# Patient Record
Sex: MALE | Race: BLACK OR AFRICAN AMERICAN | NOT HISPANIC OR LATINO | URBAN - METROPOLITAN AREA
[De-identification: names, ages, dates, MRNs, and addresses within clinical notes are randomized per-mention and may not be internally consistent; named-entity substitution may affect disease eponyms.]

---

## 2023-06-08 ENCOUNTER — EMERGENCY (EMERGENCY)
Facility: HOSPITAL | Age: 68
LOS: 1 days | Discharge: HOME | End: 2023-06-08
Attending: EMERGENCY MEDICINE
Payer: MEDICARE

## 2023-06-08 ENCOUNTER — OUTPATIENT (OUTPATIENT)
Dept: OUTPATIENT SERVICES | Facility: HOSPITAL | Age: 68
LOS: 1 days | End: 2023-06-08
Payer: MEDICARE

## 2023-06-08 VITALS
TEMPERATURE: 98 F | DIASTOLIC BLOOD PRESSURE: 70 MMHG | OXYGEN SATURATION: 98 % | RESPIRATION RATE: 16 BRPM | HEART RATE: 79 BPM | HEIGHT: 66 IN | SYSTOLIC BLOOD PRESSURE: 103 MMHG | WEIGHT: 166.89 LBS

## 2023-06-08 VITALS
WEIGHT: 164.91 LBS | SYSTOLIC BLOOD PRESSURE: 104 MMHG | OXYGEN SATURATION: 98 % | HEART RATE: 71 BPM | DIASTOLIC BLOOD PRESSURE: 71 MMHG | RESPIRATION RATE: 16 BRPM | TEMPERATURE: 98 F

## 2023-06-08 DIAGNOSIS — Z01.818 ENCOUNTER FOR OTHER PREPROCEDURAL EXAMINATION: ICD-10-CM

## 2023-06-08 DIAGNOSIS — K05.6 PERIODONTAL DISEASE, UNSPECIFIED: ICD-10-CM

## 2023-06-08 DIAGNOSIS — K02.62 DENTAL CARIES ON SMOOTH SURFACE PENETRATING INTO DENTIN: ICD-10-CM

## 2023-06-08 DIAGNOSIS — K02.9 DENTAL CARIES, UNSPECIFIED: ICD-10-CM

## 2023-06-08 LAB
ALBUMIN SERPL ELPH-MCNC: 4.2 G/DL — SIGNIFICANT CHANGE UP (ref 3.3–5)
ALP SERPL-CCNC: 130 U/L — HIGH (ref 40–120)
ALT FLD-CCNC: 19 U/L — SIGNIFICANT CHANGE UP (ref 10–45)
ANION GAP SERPL CALC-SCNC: 11 MMOL/L — SIGNIFICANT CHANGE UP (ref 5–17)
AST SERPL-CCNC: 22 U/L — SIGNIFICANT CHANGE UP (ref 10–40)
BILIRUB SERPL-MCNC: 0.4 MG/DL — SIGNIFICANT CHANGE UP (ref 0.2–1.2)
BUN SERPL-MCNC: 17 MG/DL — SIGNIFICANT CHANGE UP (ref 7–23)
CALCIUM SERPL-MCNC: 10 MG/DL — SIGNIFICANT CHANGE UP (ref 8.4–10.5)
CHLORIDE SERPL-SCNC: 105 MMOL/L — SIGNIFICANT CHANGE UP (ref 96–108)
CO2 SERPL-SCNC: 24 MMOL/L — SIGNIFICANT CHANGE UP (ref 22–31)
CREAT SERPL-MCNC: 0.87 MG/DL — SIGNIFICANT CHANGE UP (ref 0.5–1.3)
EGFR: 95 ML/MIN/1.73M2 — SIGNIFICANT CHANGE UP
GLUCOSE SERPL-MCNC: 74 MG/DL — SIGNIFICANT CHANGE UP (ref 70–99)
HCT VFR BLD CALC: 44.1 % — SIGNIFICANT CHANGE UP (ref 39–50)
HGB BLD-MCNC: 15.2 G/DL — SIGNIFICANT CHANGE UP (ref 13–17)
MCHC RBC-ENTMCNC: 29.6 PG — SIGNIFICANT CHANGE UP (ref 27–34)
MCHC RBC-ENTMCNC: 34.5 GM/DL — SIGNIFICANT CHANGE UP (ref 32–36)
MCV RBC AUTO: 85.8 FL — SIGNIFICANT CHANGE UP (ref 80–100)
NRBC # BLD: 0 /100 WBCS — SIGNIFICANT CHANGE UP (ref 0–0)
PLATELET # BLD AUTO: 177 K/UL — SIGNIFICANT CHANGE UP (ref 150–400)
POTASSIUM SERPL-MCNC: 4.4 MMOL/L — SIGNIFICANT CHANGE UP (ref 3.5–5.3)
POTASSIUM SERPL-SCNC: 4.4 MMOL/L — SIGNIFICANT CHANGE UP (ref 3.5–5.3)
PROT SERPL-MCNC: 8 G/DL — SIGNIFICANT CHANGE UP (ref 6–8.3)
RBC # BLD: 5.14 M/UL — SIGNIFICANT CHANGE UP (ref 4.2–5.8)
RBC # FLD: 13.6 % — SIGNIFICANT CHANGE UP (ref 10.3–14.5)
SODIUM SERPL-SCNC: 140 MMOL/L — SIGNIFICANT CHANGE UP (ref 135–145)
WBC # BLD: 6.44 K/UL — SIGNIFICANT CHANGE UP (ref 3.8–10.5)
WBC # FLD AUTO: 6.44 K/UL — SIGNIFICANT CHANGE UP (ref 3.8–10.5)

## 2023-06-08 PROCEDURE — 71046 X-RAY EXAM CHEST 2 VIEWS: CPT

## 2023-06-08 PROCEDURE — 85027 COMPLETE CBC AUTOMATED: CPT

## 2023-06-08 PROCEDURE — 99283 EMERGENCY DEPT VISIT LOW MDM: CPT

## 2023-06-08 PROCEDURE — 99282 EMERGENCY DEPT VISIT SF MDM: CPT

## 2023-06-08 PROCEDURE — 71046 X-RAY EXAM CHEST 2 VIEWS: CPT | Mod: 26

## 2023-06-08 PROCEDURE — G0463: CPT

## 2023-06-08 PROCEDURE — 80053 COMPREHEN METABOLIC PANEL: CPT

## 2023-06-08 NOTE — H&P PST ADULT - ASSESSMENT
Mallampati 2  DASI score: 5.38  DASI activity: ADLs, walks up 1 flight of stairs, able to walk 2 blocks, moderate recreational activity at [facility]  Loose teeth or denture: denies loose teeth/dentures    Dental Caries

## 2023-06-08 NOTE — ED PROVIDER NOTE - PHYSICAL EXAMINATION
CONSTITUTIONAL: NAD  SKIN: Warm dry, normal skin turgor  HEAD: NCAT  ENT: Pt missing several upper and lower molars/premolars but dentition otherwise in tact w/ no erythema, no swelling.   NECK: Supple; non tender. Full ROM.  CARD: RRR  RESP: No respiratory distress  ABD: non-distended  MSK: Full ROM, no leg swelling  PSYCH: Cooperative, appropriate.

## 2023-06-08 NOTE — H&P PST ADULT - NSANTHOSAYNRD_GEN_A_CORE
No. JIHAN screening performed.  STOP BANG Legend: 0-2 = LOW Risk; 3-4 = INTERMEDIATE Risk; 5-8 = HIGH Risk

## 2023-06-08 NOTE — ED PROVIDER NOTE - CLINICAL SUMMARY MEDICAL DECISION MAKING FREE TEXT BOX
Pt arrives for unknown presurgical testing, will try and get collateral, pt has no complaints at this time. Pt arrives for unknown presurgical testing, will try and get collateral, pt has no complaints at this time.  [place]: went to see pt but he and his attendant had left and walked to dental clinic.

## 2023-06-08 NOTE — H&P PST ADULT - HAVE YOU HAD COVID IN THE LAST 60 DAYS?
Fever    A fever is an increase in the body's temperature above 100.4°F (38°C) or higher. In adults and children older than three months, a brief mild or moderate fever generally has no long-term effect, and it usually does not require treatment. Many times, fevers are the result of viral infections, which are self-resolving.  However, certain symptoms or diagnostic tests may suggest a bacterial infection that may respond to antibiotics. Take medications as directed by your health care provider.    SEEK IMMEDIATE MEDICAL CARE IF YOU OR YOUR CHILD HAVE ANY OF THE FOLLOWING SYMPTOMS : shortness of breath, seizure, rash/stiff neck/headache, severe abdominal pain, persistent vomiting, any signs of dehydration, or if your child has a fever for over five (5) days.    Diarrhea    Diarrhea is frequent loose or watery bowel movements that has many causes. Diarrhea can make you feel weak and cause you to become dehydrated. Diarrhea typically lasts 2–3 days, but can last longer if it is a sign of something more serious. Drink clear fluids to prevent dehydration. Eat bland, easy-to-digest foods as tolerated.     SEEK IMMEDIATE MEDICAL CARE IF YOU HAVE ANY OF THE FOLLOWING SYMPTOMS: high fevers, lightheadedness/dizziness, chest pain, black or bloody stools, shortness of breath, severe abdominal or back pain, or any signs of dehydration. No

## 2023-06-08 NOTE — H&P PST ADULT - NSICDXPASTMEDICALHX_GEN_ALL_CORE_FT
PAST MEDICAL HISTORY:  Anxiety     Dental caries     Developmental delay     Moderate intellectual disability     Periodontal disease     Positive PPD, treated     Thoracic scoliosis

## 2023-06-08 NOTE — H&P PST ADULT - GASTROINTESTINAL COMMENTS
h/o choking on food because he eats too fast - thin liquids ok, food cut into 1/2 inch pieces - eats with supervision

## 2023-06-08 NOTE — H&P PST ADULT - PROBLEM SELECTOR PLAN 1
Comprehensive Dental Treatment Under Anesthesia  [facility] Staff will schedule preop evaluation with PCP Dr Arnol Cabello Mindful questionnaire given to [facility] staff with instructions to bring completed form back to hospital on day of surgery  Pt sent to Radiology for CXR 6/8/2023 given h/o choking on food due to impulsivity/eating too fast  [facility] staff informed that anesthesia will contact designated [facility] legal guardian Sandra Laguerre at [phone] prior to day of surgery to obtain consent.

## 2023-06-08 NOTE — H&P PST ADULT - HISTORY OF PRESENT ILLNESS
68 yo male with h/o moderate intellectual disability and dental caries is scheduled for Comprehensive Dental Treatment Under Anesthesia on 6/21/2023.    Pt is accompanied by [facility] Staff - information obtained from [facility] Staff and patient binder from Group Home. Per staff, patients legal guardian is Sandra Laguerre ([facility] legal guardian) [phone]. 68 yo male with h/o moderate intellectual disability and dental caries is scheduled for Comprehensive Dental Treatment Under Anesthesia on 6/21/2023.    Pt is accompanied by [facility] Staff - information obtained from [facility] Staff and patient binder from [facility]. Per staff, patient's legal guardian is Sandra Laguerre ([facility] legal guardian) [phone].

## 2023-06-08 NOTE — ED ADULT NURSE NOTE - NS ED NURSE ELOPE COMMENTS
pt did not wait for d/c paperwork; pt aide reports "we came the wrong place, we're supposed to go to dental clinic, I'm calling to find out where the location is"

## 2023-06-08 NOTE — H&P PST ADULT - PRO ARRIVE FROM
[facility] [address] [place] [address] [phone] Jaycee - /group home

## 2023-06-08 NOTE — ED ADULT NURSE REASSESSMENT NOTE - NS ED NURSE REASSESS COMMENT FT1
pt did not wait for d/c paperwork; pt aide reports "we came to the wrong place, we're supposed to go to dental clinic, I'm calling to find out where the location is." pt and pt aide directed to dental clinic by red desk/security, seen leaving together by security. MD Deutsch and BRITTANI Mcallister aware

## 2023-06-08 NOTE — ED PROVIDER NOTE - OBJECTIVE STATEMENT
Patient w/ hx of developmental delay was sent for pre-op testing and told to see dental, pt's caretaker states "I'm in the wrong place". Patient has no symptoms at this time, no complaints. Patient and caretaker eloped.

## 2023-06-08 NOTE — ED ADULT NURSE NOTE - OBJECTIVE STATEMENT
68 yo male with pmh cognitive impairment presents to ED with aide for dental appointment. Per aide, pt has an appointment at the dental clinic this morning and states "we came to the wrong place by accident, we're supposed to go to the dental clinic." Pt breathing spontaneous and unlabored, non verbal indicators of pain absent, moving all extremities. MD at bedside. See reassessment note

## 2023-09-07 PROBLEM — F71 MODERATE INTELLECTUAL DISABILITIES: Chronic | Status: ACTIVE | Noted: 2023-06-08

## 2023-09-07 PROBLEM — R76.11 NONSPECIFIC REACTION TO TUBERCULIN SKIN TEST WITHOUT ACTIVE TUBERCULOSIS: Chronic | Status: ACTIVE | Noted: 2023-06-08

## 2023-09-07 PROBLEM — R62.50 UNSPECIFIED LACK OF EXPECTED NORMAL PHYSIOLOGICAL DEVELOPMENT IN CHILDHOOD: Chronic | Status: ACTIVE | Noted: 2023-06-08

## 2023-09-07 PROBLEM — F41.9 ANXIETY DISORDER, UNSPECIFIED: Chronic | Status: ACTIVE | Noted: 2023-06-08

## 2023-09-07 PROBLEM — M41.9 SCOLIOSIS, UNSPECIFIED: Chronic | Status: ACTIVE | Noted: 2023-06-08

## 2023-09-07 PROBLEM — K05.6 PERIODONTAL DISEASE, UNSPECIFIED: Chronic | Status: ACTIVE | Noted: 2023-06-08

## 2023-09-07 PROBLEM — K02.9 DENTAL CARIES, UNSPECIFIED: Chronic | Status: ACTIVE | Noted: 2023-06-08

## 2023-09-12 ENCOUNTER — OUTPATIENT (OUTPATIENT)
Dept: OUTPATIENT SERVICES | Facility: HOSPITAL | Age: 68
LOS: 1 days | End: 2023-09-12
Payer: MEDICARE

## 2023-09-12 VITALS
WEIGHT: 166.01 LBS | OXYGEN SATURATION: 98 % | SYSTOLIC BLOOD PRESSURE: 105 MMHG | HEART RATE: 67 BPM | RESPIRATION RATE: 18 BRPM | HEIGHT: 65.5 IN | DIASTOLIC BLOOD PRESSURE: 67 MMHG | TEMPERATURE: 97 F

## 2023-09-12 DIAGNOSIS — K05.6 PERIODONTAL DISEASE, UNSPECIFIED: ICD-10-CM

## 2023-09-12 DIAGNOSIS — Z91.89 OTHER SPECIFIED PERSONAL RISK FACTORS, NOT ELSEWHERE CLASSIFIED: ICD-10-CM

## 2023-09-12 DIAGNOSIS — F20.9 SCHIZOPHRENIA, UNSPECIFIED: ICD-10-CM

## 2023-09-12 DIAGNOSIS — K08.89 OTHER SPECIFIED DISORDERS OF TEETH AND SUPPORTING STRUCTURES: ICD-10-CM

## 2023-09-12 DIAGNOSIS — K02.62 DENTAL CARIES ON SMOOTH SURFACE PENETRATING INTO DENTIN: ICD-10-CM

## 2023-09-12 LAB
ANION GAP SERPL CALC-SCNC: 13 MMOL/L — SIGNIFICANT CHANGE UP (ref 7–14)
BUN SERPL-MCNC: 20 MG/DL — SIGNIFICANT CHANGE UP (ref 7–23)
CALCIUM SERPL-MCNC: 9.4 MG/DL — SIGNIFICANT CHANGE UP (ref 8.4–10.5)
CHLORIDE SERPL-SCNC: 102 MMOL/L — SIGNIFICANT CHANGE UP (ref 98–107)
CO2 SERPL-SCNC: 27 MMOL/L — SIGNIFICANT CHANGE UP (ref 22–31)
CREAT SERPL-MCNC: 0.99 MG/DL — SIGNIFICANT CHANGE UP (ref 0.5–1.3)
EGFR: 84 ML/MIN/1.73M2 — SIGNIFICANT CHANGE UP
GLUCOSE SERPL-MCNC: 76 MG/DL — SIGNIFICANT CHANGE UP (ref 70–99)
HCT VFR BLD CALC: 42.6 % — SIGNIFICANT CHANGE UP (ref 39–50)
HGB BLD-MCNC: 14.6 G/DL — SIGNIFICANT CHANGE UP (ref 13–17)
MCHC RBC-ENTMCNC: 29.6 PG — SIGNIFICANT CHANGE UP (ref 27–34)
MCHC RBC-ENTMCNC: 34.3 GM/DL — SIGNIFICANT CHANGE UP (ref 32–36)
MCV RBC AUTO: 86.4 FL — SIGNIFICANT CHANGE UP (ref 80–100)
NRBC # BLD: 0 /100 WBCS — SIGNIFICANT CHANGE UP (ref 0–0)
NRBC # FLD: 0 K/UL — SIGNIFICANT CHANGE UP (ref 0–0)
PLATELET # BLD AUTO: 177 K/UL — SIGNIFICANT CHANGE UP (ref 150–400)
POTASSIUM SERPL-MCNC: 4 MMOL/L — SIGNIFICANT CHANGE UP (ref 3.5–5.3)
POTASSIUM SERPL-SCNC: 4 MMOL/L — SIGNIFICANT CHANGE UP (ref 3.5–5.3)
RBC # BLD: 4.93 M/UL — SIGNIFICANT CHANGE UP (ref 4.2–5.8)
RBC # FLD: 13.2 % — SIGNIFICANT CHANGE UP (ref 10.3–14.5)
SODIUM SERPL-SCNC: 142 MMOL/L — SIGNIFICANT CHANGE UP (ref 135–145)
WBC # BLD: 6.37 K/UL — SIGNIFICANT CHANGE UP (ref 3.8–10.5)
WBC # FLD AUTO: 6.37 K/UL — SIGNIFICANT CHANGE UP (ref 3.8–10.5)

## 2023-09-12 PROCEDURE — 93010 ELECTROCARDIOGRAM REPORT: CPT

## 2023-09-12 RX ORDER — SODIUM CHLORIDE 9 MG/ML
1000 INJECTION, SOLUTION INTRAVENOUS
Refills: 0 | Status: DISCONTINUED | OUTPATIENT
Start: 2023-09-21 | End: 2023-10-05

## 2023-09-12 NOTE — H&P PST ADULT - PROBLEM SELECTOR PLAN 2
Patient instructed to take Haloperidol, Benzotropine and sertraline with a sip of water on the morning of procedure. Patient instructed to take Haloperidol, Benztropine and sertraline with a sip of water on the morning of procedure.

## 2023-09-12 NOTE — H&P PST ADULT - NEUROLOGICAL
normal/sensation intact/responds to pain/responds to verbal commands/cranial nerves intact details… sensation intact/responds to pain/responds to verbal commands/cranial nerves intact

## 2023-09-12 NOTE — H&P PST ADULT - NEGATIVE ENMT SYMPTOMS
Denies dentures. Denies loose teeth./no nasal congestion/no nose bleeds/no throat pain/no dysphagia Loose teeth- left and right central incisor/no nasal congestion/no nose bleeds/no throat pain/no dysphagia

## 2023-09-12 NOTE — H&P PST ADULT - NS PRO FEM  PAP SMEARS 3YRS
Rx [facility].    Discussed with RN, cancelled Rx to [facility].    Rashard Milligan MD     not applicable (Male)

## 2023-09-12 NOTE — H&P PST ADULT - HISTORY OF PRESENT ILLNESS
67 year old male with pmhx of HTN, Positive PPD ( s/p treatment with INH 1993), Pervasive developemental disorder, OCD, Schizoprenia, Psychosis, BPH, presents for pre-op evaluation for diagnosis of Periodontal disease unspecified. Patient is scheduled for Comprehensive dental.

## 2023-09-12 NOTE — H&P PST ADULT - NSICDXPASTMEDICALHX_GEN_ALL_CORE_FT
PAST MEDICAL HISTORY:  Anxiety     Arm fracture, left     At risk for elopement     Dental caries     Developmental delay     Finger fracture     H/O obsessive compulsive disorder     H/O schizophrenia     Hard of hearing     History of hemorrhoids     Hypertension     Lipoma     Moderate intellectual disability     Osteoarthritis     Periodontal disease     Periodontal disease, unspecified     Pervasive developmental disorder     Positive PPD, treated     Thoracic scoliosis

## 2023-09-12 NOTE — H&P PST ADULT - MENTAL STATUS
Alert to self, verbal, confused with situation Alert to self, verbal, confused with situation, follows commands

## 2023-09-12 NOTE — H&P PST ADULT - PHARYNX
MALLAMPATI:/normal/no redness/no discharge/no peritonsillar abscess MALLAMPATI: II/normal/no redness/no discharge/no peritonsillar abscess

## 2023-09-12 NOTE — H&P PST ADULT - PROBLEM SELECTOR PLAN 1
Patient tentatively scheduled for Comprehensive dental on 9/21/23.  Pre-op instructions provided. Pt given verbal and written instructions with teach back onpepcid. Pt verbalized understanding.    CBC, BMP, EKG were done at [facility].   Medical evaluation requested (Hx of HTN, Pervasive developmental delay, Schizophrenia).   Patient has an appointment 9/19/23.  [facility]- Surrogate decision making committee consent in chart. Patient tentatively scheduled for Comprehensive dental on 9/21/23.  Pre-op instructions provided. Pt given verbal and written instructions with teach back on pepcid. Pt verbalized understanding.    CBC, BMP, EKG were done at [facility].   Medical evaluation requested (Hx of HTN, Pervasive developmental delay, Schizophrenia).   Patient has an appointment 9/19/23.  [facility]- Surrogate decision making committee consent in chart for Medical treatment.

## 2023-09-20 NOTE — ASU PATIENT PROFILE, ADULT - FALL HARM RISK - UNIVERSAL INTERVENTIONS
Bed in lowest position, wheels locked, appropriate side rails in place/Call bell, personal items and telephone in reach/Instruct patient to call for assistance before getting out of bed or chair/Non-slip footwear when patient is out of bed/[place] to call system/Physically safe environment - no spills, clutter or unnecessary equipment/Purposeful Proactive Rounding/Room/bathroom lighting operational, light cord in reach

## 2023-09-21 ENCOUNTER — OUTPATIENT (OUTPATIENT)
Dept: OUTPATIENT SERVICES | Facility: HOSPITAL | Age: 68
LOS: 1 days | Discharge: HOME | End: 2023-09-21

## 2023-09-21 VITALS
SYSTOLIC BLOOD PRESSURE: 118 MMHG | WEIGHT: 166.01 LBS | RESPIRATION RATE: 17 BRPM | HEART RATE: 63 BPM | HEIGHT: 65.5 IN | DIASTOLIC BLOOD PRESSURE: 65 MMHG | TEMPERATURE: 98 F | OXYGEN SATURATION: 100 %

## 2023-09-21 VITALS
DIASTOLIC BLOOD PRESSURE: 70 MMHG | HEART RATE: 72 BPM | SYSTOLIC BLOOD PRESSURE: 121 MMHG | OXYGEN SATURATION: 99 % | RESPIRATION RATE: 19 BRPM

## 2023-09-21 DIAGNOSIS — K05.6 PERIODONTAL DISEASE, UNSPECIFIED: ICD-10-CM

## 2023-09-21 DEVICE — SURGICEL 4 X 8": Type: IMPLANTABLE DEVICE | Status: FUNCTIONAL

## 2023-09-21 RX ORDER — HALOPERIDOL DECANOATE 100 MG/ML
1 INJECTION INTRAMUSCULAR
Refills: 0 | DISCHARGE

## 2023-09-21 RX ORDER — SERTRALINE 25 MG/1
1.5 TABLET, FILM COATED ORAL
Refills: 0 | DISCHARGE

## 2023-09-21 RX ORDER — POLYETHYLENE GLYCOL 3350 17 G/17G
17 POWDER, FOR SOLUTION ORAL
Refills: 0 | DISCHARGE

## 2023-09-21 RX ORDER — BENZTROPINE MESYLATE 1 MG
1 TABLET ORAL
Refills: 0 | DISCHARGE

## 2023-09-21 RX ORDER — SOD,AMMONIUM,POTASSIUM LACTATE
1 CREAM (GRAM) TOPICAL
Refills: 0 | DISCHARGE

## 2023-09-21 RX ORDER — ONDANSETRON 8 MG/1
4 TABLET, FILM COATED ORAL ONCE
Refills: 0 | Status: DISCONTINUED | OUTPATIENT
Start: 2023-09-21 | End: 2023-10-05

## 2023-09-21 NOTE — ASU DISCHARGE PLAN (ADULT/PEDIATRIC) - NURSING INSTRUCTIONS
DO NOT take any Tylenol (Acetaminophen) or narcotics containing Tylenol until after  __3:30pm____ . You received Tylenol during your operation and it can cause damage to your liver if too much is taken within a 24 hour time period.

## 2023-09-21 NOTE — ASU DISCHARGE PLAN (ADULT/PEDIATRIC) - ASU DC SPECIAL INSTRUCTIONSFT
extractions performed as medically necessary to the2  upper left posterior, one to the lower anterior  and  2 to the  lower left posterior as they were hopeless    no straw for two days and keep head elevated for the next two days     tylenol 2x 325 MG tablets every 6 hours as needed for the next few days for comfort     ices diet for two days and ice to the outside of the face on and off for the next two days, 20 minutes on and off       return to program on Friday , tomorrow    see Dr Deulna on Tuesday at 10:30 am for a follow up a [place] [facility] [place]     exam, restorative and periodontal treatment performed as well

## 2023-09-21 NOTE — ASU DISCHARGE PLAN (ADULT/PEDIATRIC) - NS MD DC FALL RISK RISK
For information on Fall & Injury Prevention, visit: [email or web address] OR  [email or web address] OR  https://www.cdc.gov/steadi/patient.html

## 2023-09-21 NOTE — ASU DISCHARGE PLAN (ADULT/PEDIATRIC) - ACCOMPANIED BY
Family Staged Advancement Flap Text: The defect edges were debeveled with a #15 scalpel blade.  Given the location of the defect, shape of the defect and the proximity to free margins a staged advancement flap was deemed most appropriate.  Using a sterile surgical marker, an appropriate advancement flap was drawn incorporating the defect and placing the expected incisions within the relaxed skin tension lines where possible. The area thus outlined was incised deep to adipose tissue with a #15 scalpel blade.  The skin margins were undermined to an appropriate distance in all directions utilizing iris scissors.

## 2024-12-30 PROBLEM — Z86.59 PERSONAL HISTORY OF OTHER MENTAL AND BEHAVIORAL DISORDERS: Chronic | Status: ACTIVE | Noted: 2023-09-12

## 2024-12-30 PROBLEM — Z91.89 OTHER SPECIFIED PERSONAL RISK FACTORS, NOT ELSEWHERE CLASSIFIED: Chronic | Status: ACTIVE | Noted: 2023-09-12

## 2024-12-30 PROBLEM — I10 ESSENTIAL (PRIMARY) HYPERTENSION: Chronic | Status: ACTIVE | Noted: 2023-09-12

## 2024-12-30 PROBLEM — S62.609A FRACTURE OF UNSPECIFIED PHALANX OF UNSPECIFIED FINGER, INITIAL ENCOUNTER FOR CLOSED FRACTURE: Chronic | Status: ACTIVE | Noted: 2023-09-12

## 2024-12-30 PROBLEM — M19.90 UNSPECIFIED OSTEOARTHRITIS, UNSPECIFIED SITE: Chronic | Status: ACTIVE | Noted: 2023-09-12

## 2024-12-30 PROBLEM — D17.9 BENIGN LIPOMATOUS NEOPLASM, UNSPECIFIED: Chronic | Status: ACTIVE | Noted: 2023-09-12

## 2024-12-30 PROBLEM — H91.90 UNSPECIFIED HEARING LOSS, UNSPECIFIED EAR: Chronic | Status: ACTIVE | Noted: 2023-09-12

## 2024-12-30 PROBLEM — F84.9 PERVASIVE DEVELOPMENTAL DISORDER, UNSPECIFIED: Chronic | Status: ACTIVE | Noted: 2023-09-12

## 2024-12-30 PROBLEM — S42.302A UNSPECIFIED FRACTURE OF SHAFT OF HUMERUS, LEFT ARM, INITIAL ENCOUNTER FOR CLOSED FRACTURE: Chronic | Status: ACTIVE | Noted: 2023-09-12

## 2024-12-30 PROBLEM — K05.6 PERIODONTAL DISEASE, UNSPECIFIED: Chronic | Status: ACTIVE | Noted: 2023-09-12

## 2024-12-30 PROBLEM — Z87.19 PERSONAL HISTORY OF OTHER DISEASES OF THE DIGESTIVE SYSTEM: Chronic | Status: ACTIVE | Noted: 2023-09-12

## 2025-05-23 ENCOUNTER — OUTPATIENT (OUTPATIENT)
Dept: OUTPATIENT SERVICES | Facility: HOSPITAL | Age: 70
LOS: 1 days | End: 2025-05-23

## 2025-05-23 VITALS
OXYGEN SATURATION: 98 % | DIASTOLIC BLOOD PRESSURE: 85 MMHG | RESPIRATION RATE: 18 BRPM | SYSTOLIC BLOOD PRESSURE: 133 MMHG | TEMPERATURE: 97 F | HEIGHT: 66 IN | WEIGHT: 176.37 LBS | HEART RATE: 67 BPM

## 2025-05-23 DIAGNOSIS — K02.9 DENTAL CARIES, UNSPECIFIED: ICD-10-CM

## 2025-05-23 DIAGNOSIS — K02.61 DENTAL CARIES ON SMOOTH SURFACE LIMITED TO ENAMEL: ICD-10-CM

## 2025-05-23 DIAGNOSIS — Z86.59 PERSONAL HISTORY OF OTHER MENTAL AND BEHAVIORAL DISORDERS: ICD-10-CM

## 2025-05-23 DIAGNOSIS — Z92.89 PERSONAL HISTORY OF OTHER MEDICAL TREATMENT: Chronic | ICD-10-CM

## 2025-05-23 DIAGNOSIS — Z91.89 OTHER SPECIFIED PERSONAL RISK FACTORS, NOT ELSEWHERE CLASSIFIED: ICD-10-CM

## 2025-05-23 NOTE — H&P PST ADULT - NEGATIVE GASTROINTESTINAL SYMPTOMS
Vaccine Information Statement(s) was given today. This has been reviewed, questions answered, and verbal consent given by Patient for injection(s) and administration of Influenza (Inactivated).    Patient tolerated without incident. See immunization grid for documentation.     no nausea/no vomiting/no diarrhea/no abdominal pain

## 2025-05-23 NOTE — H&P PST ADULT - ASSESSMENT
Dental Caries Smooth Surface Penetrating into Dentition, Dental Caries on Smooth Surface LTD to Enamel

## 2025-05-23 NOTE — H&P PST ADULT - FUNCTIONAL STATUS
METS 7.68 - able to participate in moderate activity, able to walk 1-2 blocks, climb flight of stairs without any shortness of breath or difficulty.

## 2025-05-23 NOTE — H&P PST ADULT - EYES

## 2025-05-23 NOTE — H&P PST ADULT - PROBLEM SELECTOR PLAN 1
Patient is tentatively scheduled for Comprehensive Dental Treatment Under General Anesthesia on 6/6/25. Pre-op instructions provided to group home staff. Given verbal and written instructions on  Pepcid. Group home staff verbalized understanding.

## 2025-05-23 NOTE — H&P PST ADULT - NSICDXPASTMEDICALHX_GEN_ALL_CORE_FT
PAST MEDICAL HISTORY:  Anxiety     Arm fracture, left     At risk for elopement     BPH (benign prostatic hyperplasia)     Dental caries     Developmental delay     Finger fracture     H/O obsessive compulsive disorder     H/O schizophrenia     Hard of hearing     History of hemorrhoids     Hypertension     Lipoma     Moderate intellectual disability     Osteoarthritis     Periodontal disease     Periodontal disease, unspecified     Pervasive developmental disorder     Positive PPD, treated     Thoracic scoliosis

## 2025-05-23 NOTE — H&P PST ADULT - PROBLEM SELECTOR PLAN 2
Instructed  to take Cogentin, Haloperidol, Sertraline  with a sip of water on the morning of procedure.    Hx of Elopement - requires 1:1 supervision as per Medical Profile sheet

## 2025-05-23 NOTE — H&P PST ADULT - HISTORY OF PRESENT ILLNESS
69 year old male with history moderate intellectual disability, HTN, BPH, arthritis, Positive PPD ( s/p treatment with INH 1993), OCD, Schizophrenia, Psychosis with dental caries, presents to [facility] for evaluation. He is scheduled for Comprehensive Dental Treatment Under General Anesthesia on 6/6/25.     Patient is a [facility] of the [facility]. Informed Consent for Major Medical Treatment form in chart.   [phone] [facility]

## 2025-06-03 NOTE — H&P PST ADULT - PSYCHIATRIC
Today Sania Mercado was handed the Physical vs. Office visit form explaining the services.      Check with insurance to see if PCV 20 is covered  Schedule mammogram  [phone]  Schedule colonoscopy  GASTROENTEROLOGY  [phone]  See pulmonology for abnormal chest CT - PULMONARY [phone]     details… normal affect/normal behavior

## 2025-06-06 ENCOUNTER — OUTPATIENT (OUTPATIENT)
Dept: OUTPATIENT SERVICES | Facility: HOSPITAL | Age: 70
LOS: 1 days | End: 2025-06-06

## 2025-06-06 VITALS
HEART RATE: 90 BPM | DIASTOLIC BLOOD PRESSURE: 79 MMHG | SYSTOLIC BLOOD PRESSURE: 124 MMHG | RESPIRATION RATE: 21 BRPM | OXYGEN SATURATION: 98 % | TEMPERATURE: 98 F

## 2025-06-06 VITALS
SYSTOLIC BLOOD PRESSURE: 133 MMHG | WEIGHT: 176.37 LBS | OXYGEN SATURATION: 100 % | TEMPERATURE: 98 F | HEART RATE: 77 BPM | HEIGHT: 66 IN | DIASTOLIC BLOOD PRESSURE: 78 MMHG | RESPIRATION RATE: 20 BRPM

## 2025-06-06 DIAGNOSIS — K02.61 DENTAL CARIES ON SMOOTH SURFACE LIMITED TO ENAMEL: ICD-10-CM

## 2025-06-06 DIAGNOSIS — Z92.89 PERSONAL HISTORY OF OTHER MEDICAL TREATMENT: Chronic | ICD-10-CM

## 2025-06-06 DEVICE — SURGICEL 2 X 14": Type: IMPLANTABLE DEVICE | Status: FUNCTIONAL

## 2025-06-06 RX ORDER — B1/B2/B3/B5/B6/B12/VIT C/FOLIC 500-0.5 MG
1 TABLET ORAL
Refills: 0 | DISCHARGE

## 2025-06-06 RX ORDER — HALOPERIDOL 10 MG/1
1 TABLET ORAL
Refills: 0 | DISCHARGE

## 2025-06-06 RX ORDER — TAMSULOSIN HYDROCHLORIDE 0.4 MG/1
1 CAPSULE ORAL
Refills: 0 | DISCHARGE

## 2025-06-06 RX ORDER — BENZTROPINE MESYLATE 2 MG
1 TABLET ORAL
Refills: 0 | DISCHARGE

## 2025-06-06 RX ORDER — POLYETHYLENE GLYCOL 3350 17 G/17G
17 POWDER, FOR SOLUTION ORAL
Refills: 0 | DISCHARGE

## 2025-06-06 RX ORDER — SERTRALINE 100 MG/1
1 TABLET, FILM COATED ORAL
Refills: 0 | DISCHARGE

## 2025-06-06 NOTE — ASU DISCHARGE PLAN (ADULT/PEDIATRIC) - PROCEDURE
comprehensive dental treatment under general anesthesia, exam, xrays, cleaning, and multiple extractions

## 2025-06-06 NOTE — ASU DISCHARGE PLAN (ADULT/PEDIATRIC) - NS MD DC FALL RISK RISK
For information on Fall & Injury Prevention, visit: [email or web address] OR  [email or web address] OR  https://www.cdc.gov/steadi/patient.html

## 2025-06-06 NOTE — ASU DISCHARGE PLAN (ADULT/PEDIATRIC) - ASU DC SPECIAL INSTRUCTIONSFT
comprehensive dental treatment under general anesthesia, exam, xrays, cleaning, and multiple extractions performed due to severe mobility and or infection,  no straw for two days and keep head elevated for the next two days and nights       amoxicillin was [facility] to [facility]     tylenol prn pain and give him tylenol for the next two-4 days for comfort     see DR Deluna in one week, appointment will be at [facility] [phone] on 6/12 11 am      resume all medications    return to routine activities tomorrow if patient feels well

## 2025-06-06 NOTE — ASU DISCHARGE PLAN (ADULT/PEDIATRIC) - NURSING INSTRUCTIONS
You received IV Tylenol for pain management at 2 Please DO NOT take any Tylenol (Acetaminophen) containing products, such as Vicodin, Percocet, Excedrin, and cold medications for the next 6 hours (until 8 PM). DO NOT TAKE MORE THAN 4000 MG OF TYLENOL in a 24 hour period.     You received IV Toradol for pain management at 2 PM Please DO NOT take Motrin/Ibuprofen/Advil/Aleve/NSAIDs (Non-Steroidal Anti-Inflammatory Drugs) for the next 6 hours (until 8 PM).

## 2025-06-06 NOTE — ASU DISCHARGE PLAN (ADULT/PEDIATRIC) - FINANCIAL ASSISTANCE
[facility] provides services at a reduced cost to those who are determined to be eligible through [facility]’s financial assistance program. Information regarding [facility]’s financial assistance program can be found by going to [email or web address] or by calling [phone].
